# Patient Record
Sex: FEMALE | Race: OTHER | HISPANIC OR LATINO | URBAN - METROPOLITAN AREA
[De-identification: names, ages, dates, MRNs, and addresses within clinical notes are randomized per-mention and may not be internally consistent; named-entity substitution may affect disease eponyms.]

---

## 2023-07-07 ENCOUNTER — OUTPATIENT (OUTPATIENT)
Dept: OUTPATIENT SERVICES | Facility: HOSPITAL | Age: 23
LOS: 1 days | Discharge: HOME | End: 2023-07-07

## 2023-07-11 DIAGNOSIS — F41.1 GENERALIZED ANXIETY DISORDER: ICD-10-CM

## 2023-11-12 ENCOUNTER — EMERGENCY (EMERGENCY)
Facility: HOSPITAL | Age: 23
LOS: 1 days | Discharge: HOME | End: 2023-11-12
Attending: STUDENT IN AN ORGANIZED HEALTH CARE EDUCATION/TRAINING PROGRAM | Admitting: STUDENT IN AN ORGANIZED HEALTH CARE EDUCATION/TRAINING PROGRAM
Payer: COMMERCIAL

## 2023-11-12 VITALS
HEART RATE: 94 BPM | WEIGHT: 169.98 LBS | OXYGEN SATURATION: 100 % | SYSTOLIC BLOOD PRESSURE: 143 MMHG | RESPIRATION RATE: 16 BRPM | DIASTOLIC BLOOD PRESSURE: 83 MMHG | TEMPERATURE: 99 F

## 2023-11-12 VITALS
OXYGEN SATURATION: 100 % | RESPIRATION RATE: 16 BRPM | SYSTOLIC BLOOD PRESSURE: 129 MMHG | HEART RATE: 85 BPM | TEMPERATURE: 98 F | DIASTOLIC BLOOD PRESSURE: 62 MMHG

## 2023-11-12 PROCEDURE — 73562 X-RAY EXAM OF KNEE 3: CPT | Mod: 26,RT

## 2023-11-12 PROCEDURE — 99284 EMERGENCY DEPT VISIT MOD MDM: CPT | Mod: 25

## 2023-11-12 PROCEDURE — 73610 X-RAY EXAM OF ANKLE: CPT | Mod: 26,RT

## 2023-11-12 PROCEDURE — 73590 X-RAY EXAM OF LOWER LEG: CPT | Mod: 26,RT

## 2023-11-12 PROCEDURE — 29540 STRAPPING ANKLE &/FOOT: CPT | Mod: RT

## 2023-11-12 PROCEDURE — 73630 X-RAY EXAM OF FOOT: CPT | Mod: 26,RT

## 2023-11-12 RX ORDER — IBUPROFEN 200 MG
600 TABLET ORAL ONCE
Refills: 0 | Status: COMPLETED | OUTPATIENT
Start: 2023-11-12 | End: 2023-11-12

## 2023-11-12 RX ADMIN — Medication 600 MILLIGRAM(S): at 03:52

## 2023-11-12 RX ADMIN — Medication 600 MILLIGRAM(S): at 04:30

## 2023-11-12 NOTE — ED PROVIDER NOTE - NSFOLLOWUPCLINICS_GEN_ALL_ED_FT
[facility]  Sports Medicine  [address] [place] [address]  Phone: [phone]  Fax:

## 2023-11-12 NOTE — ED ADULT NURSE NOTE - OBJECTIVE STATEMENT
Break RN: Patient arrives to 12A ambulating, awake, alert, A&OX4. Patient states she fell off a bar stool and landed on her right ankle. Patient has full ROM to ankle, swelling noted, no excess warmth or redness noted. Patient denies any fever, chills. Of note patient states she felt embarrassed after and vomited. Patient does not feel nauseous at this time. awaiting orders, will continue to monitor.

## 2023-11-12 NOTE — ED PROVIDER NOTE - PROGRESS NOTE DETAILS
Rashid PGY3: patient able to limited weight bear. ankle aircast placed. cane provided. f/u given. dc home.

## 2023-11-12 NOTE — ED PROVIDER NOTE - ATTENDING CONTRIBUTION TO CARE
23-year-old female past medical history anxiety presents for right ankle pain.  Patient was at a bar fell off a stool and landed with inversion injury.  Vomited due to pain but no nausea or vomiting since.  Patient denies head trauma, LOC, other injury.  She denies any numbness or weakness.  Exam as above, no fibular head tenderness.  Sensation intact.  2+ dorsalis pedis and posterior tibialis pulses equal bilaterally.  Presentation concerning for ankle fracture versus sprain.  Plan: X-ray, symptom relief, reassess.

## 2023-11-12 NOTE — ED PROVIDER NOTE - PHYSICAL EXAMINATION
CONSTITUTIONAL: NAD  HEAD: NCAT  EYES: NL inspection  NECK: Supple  CARD: RRR  RESP: CTAB  ABD: S/NT no R/G  EXT: R ankle swelling and ttp on both malleolar, no mid foot or 5th metatarsal ttp. No knee or shin ttp. Sensation and pulses intact  NEURO: Grossly unremarkable  PSYCH: Cooperative, appropriate.

## 2023-11-12 NOTE — ED PROVIDER NOTE - CLINICAL SUMMARY MEDICAL DECISION MAKING FREE TEXT BOX
Rashid PGY3: healthy 24yo F with PMH of anxiety presents to ED for R ankle pain after twisted it under her while getting off stool. Neurovasc intact, tender and swollen over both malleolar. Differential Diagnosis includes but not limited to fracuture vs sprain vs avulsion. Plan for xray and pain control.

## 2023-11-12 NOTE — ED PROVIDER NOTE - NSFOLLOWUPINSTRUCTIONS_ED_ALL_ED_FT
Sprain    A sprain is a stretch or tear in one of the tough, fiber-like tissues (ligaments) in your body. This is caused by an injury to the area such as a twisting mechanism. Symptoms include pain, swelling, or bruising. Rest that area over the next several days and slowly resume activity when tolerated. Ice can help with swelling and pain.     - RICE method at home - rest, ice, compress, and elevate.  - You can take Tylenol 1000mg every 6-8 hours as needed.  - You can take Motrin (ibuprofun) 600mg every 6-8 hours as needed.  - Both can be taken together as needed for pain control.     - Follow up with an ORTHOPEDIC - Sports clinic provided.  - Can remove cast to change clothes or bathe, but recommend keeping on whenever standing/walking.  - Use cane for support. No strenuous activities.     SEEK IMMEDIATE MEDICAL CARE IF YOU HAVE ANY OF THE FOLLOWING SYMPTOMS: worsening pain, inability to move that body part, numbness or tingling.

## 2023-11-12 NOTE — ED PROVIDER NOTE - PATIENT PORTAL LINK FT
You can access the FollowMyHealth Patient Portal offered by [facility] by registering at the following website: [email or web address]. By joining [employer]’s FollowMyHealth portal, you will also be able to view your health information using other applications (apps) compatible with our system.

## 2023-11-12 NOTE — ED PROVIDER NOTE - OBJECTIVE STATEMENT
healthy 24yo F with PMH of anxiety presents to ED for R ankle pain. Fell off high barstool and twisted R ankle under her. Immediately had pain, vomited due to pain. Didn't fall to floor, no head strike, no knee/hip pain or injury. Never injured ankle prior, no surgeries. Didn't take any pain meds. No CP, SOB, abd pain, NVDC. LMP Oct 21st.

## 2023-11-12 NOTE — ED ADULT TRIAGE NOTE - CHIEF COMPLAINT QUOTE
right ankle injury    pt was coming down off a high barstool and hurt right ankle.  felt embarrassed, went to bathroom and vomited x1.  no nausea now.  walking with steady gait.  admits to smoking hookah. no etoh.  past medical history- anxiety